# Patient Record
Sex: FEMALE | Race: WHITE | NOT HISPANIC OR LATINO | Employment: OTHER | URBAN - METROPOLITAN AREA
[De-identification: names, ages, dates, MRNs, and addresses within clinical notes are randomized per-mention and may not be internally consistent; named-entity substitution may affect disease eponyms.]

---

## 2019-03-14 ENCOUNTER — ESTABLISHED COMPREHENSIVE EXAM (OUTPATIENT)
Dept: URBAN - METROPOLITAN AREA CLINIC 39 | Facility: CLINIC | Age: 84
End: 2019-03-14

## 2019-03-14 DIAGNOSIS — H43.813: ICD-10-CM

## 2019-03-14 DIAGNOSIS — D31.32: ICD-10-CM

## 2019-03-14 DIAGNOSIS — H04.123: ICD-10-CM

## 2019-03-14 PROCEDURE — 92015 DETERMINE REFRACTIVE STATE: CPT

## 2019-03-14 PROCEDURE — 92014 COMPRE OPH EXAM EST PT 1/>: CPT

## 2019-03-14 ASSESSMENT — VISUAL ACUITY
OS_CC: J1
OD_SC: 20/40-1
OD_BAT: 20/40
OD_PH: 20/25+1
OS_SC: J3
OS_SC: 20/40+2
OS_BAT: 20/40
OD_CC: J1
OD_SC: J6

## 2019-03-14 ASSESSMENT — TONOMETRY
OS_IOP_MMHG: 11
OD_IOP_MMHG: 12

## 2022-06-23 NOTE — PROCEDURE NOTE: CLINICAL
PROCEDURE NOTE: Excision Chalazion, Single Right Upper Lid. Diagnosis: Chalazion. Anesthesia: Subconjunctival Lidocaine. Prep: Betadine Scrub. Prior to treatment, the risks/benefits/alternatives were discussed. The patient wished to proceed with procedure. After the risks, benefits, and alternatives to the procedure were discussed with the patient, informed consent was obtained. The patient, the procedure, and the correct site were identified beforehand. Local anesthesia was applied and the lid was prepped. The lid was clamped exposing the posterior surface on the eyelid. The chalazion was incised and removed with a curette. Bleeding was controlled and the clamp was removed. The eye was pressure patched with antibiotic ointment. The patient tolerated the procedure well and left the room in good condition. There were no complications. Post procedure instructions given. Tyler Duarte